# Patient Record
Sex: MALE | Race: WHITE | Employment: OTHER | URBAN - METROPOLITAN AREA
[De-identification: names, ages, dates, MRNs, and addresses within clinical notes are randomized per-mention and may not be internally consistent; named-entity substitution may affect disease eponyms.]

---

## 2022-01-01 ENCOUNTER — OFFICE VISIT - RIVER FALLS (OUTPATIENT)
Dept: FAMILY MEDICINE | Facility: CLINIC | Age: 82
End: 2022-01-01

## 2022-03-02 NOTE — PROGRESS NOTES
History of Present Illness       Resident is a new admission to the nursing home.  He was recently discharged from [facility] after a stay for rehabilitation.  He has history of obesity, type 2 diabetes, hypertension, hyperlipidemia, atrial fibrillation, heart failure, COPD and chronic kidney disease.  He had acute kidney failure and was discharged on dialysis.  His diabetes worsened during his hospital stay and his insulin demands increased.       Discharge summary reviewed, medications reconciled  Review of Systems       See HPI.  All other review of systems negative.  Physical Exam       Vital signs are stable, he is afebrile       Alert, oriented, no acute distress       Heart rate normal with an irregular rhythm       Lung sounds diminished throughout       Cooperative  Assessment/Plan       1. Age-related physical debility (R54)       2. Chronic atrial fibrillation, unspecified (I48.20)       3. Chronic kidney disease, stage 4 (severe) (N18.4)       4. Chronic obstructive pulmonary disease, unspecified (J44.9)       Patient will continue on his current medications, dialysis schedule.  Physical therapy and Occupational Therapy have been ordered and he is participating in both.  Follow-up at next nursing home rounds  Patient Information     Name:FREEDOM HENRIQUE MONAE      Address:      [address] [place] [id number]     Sex:Male     YOB: 1940     [phone]     Emergency Contact:CONCEPCIÓN LOJA     MRN:[id number]     FIN:[id number]     Location:[facility]     Date of Service:02/15/2022      Primary Care Physician:       NONE ,       Attending Physician:       Contreras Rodriguez MD, [phone]  Problem List/Past Medical History    Ongoing     Age-related physical debility     Cataracts, bilateral     Chronic atrial fibrillation, unspecified     Chronic kidney disease, stage 4 (severe)     Chronic obstructive pulmonary disease, unspecified      Congestive heart failure     Constipation, unspecified     Dyspnea, unspecified     Edema, unspecified     Essential (primary) hypertension     History of falling     Hyperglycemia, unspecified     Hyperlipidemia, unspecified     Obstructive sleep apnea (adult) (pediatric)     Other fatigue     Plantar fascial fibromatosis     Polycythemia vera     Polymyalgia rheumatica     Polyp of colon     Rheumatoid arthritis, unspecified     Type 2 diabetes mellitus without complications     Weakness    Historical     Inpatient stay       Comments: [facility] - [place], [place] - Progressive weakness, confusion, and nausea.     Tobacco use  Procedure/Surgical History     Colonoscopy (02/14/2019)     Colonoscopic polypectomy (04/20/2015)     Cataract extraction (2012)     Cardiac catheterization (09/14/2009)     Tonsillectomy  Medications   No active medications  Allergies    Victoza (Headache)    metFORMIN (Diarrhea)  Social History     Alcohol      Wine (5 oz), 3 glasses of wine per week     Electronic Cigarette/Vaping      Electronic Cigarette Use: Never.     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 Highest education level: Bachelor's.     Home/Environment      Marital status: . Lives with Daughter. Home equipment: CPAP/BiPAP, Walker/Cane.     Sexual      Sexually active: No. Identifies as male, Sexual orientation: Straight or heterosexual.     Substance Abuse - Denies Substance Abuse      Never     Tobacco - Past      Quit 1980, Pipe, 7 year(s).  Family History    Breast cancer: Sister.    CA - Cancer of colon: Father (Dx at 75).    CVA - Cerebrovascular accident: Mother.    Diabetes mellitus: Mother and Uncle.    Heart block: Son.    Hypertension: Mother.    Prostate cancer..: Father.  Immunizations          Scheduled Immunizations          Dose Date(s)          influenza virus vaccine, inactivated          10/15/2015, 09/28/2016, 09/26/2017          pneumococcal  (PCV13)          03/26/2015          pneumococcal (PPSV23)          01/20/2005          tetanus/diphth/pertuss (Tdap) adult/adol          12/12/2014

## 2022-03-02 NOTE — PROGRESS NOTES
Patient:   HENRIQUE LOJA            MRN: [id number]            FIN: [id number]               Age:   81 years     Sex:  Male     :  1940   Associated Diagnoses:   Type 2 diabetes mellitus without complications   Author:   Chaitanya Zhang MD      Visit Information      Date of Service: 2022 03:00 pm  Performing Location: [facility]  Encounter#: [id number]      Primary Care Provider (PCP):  NONE ,       Referring Provider:  Chaitanya Zhang MD    NPI# [id number]      History of Present Illness   Ask to see for high glucose No sxs      Health Status   Allergies:    Allergic Reactions (Selected)  Severity Not Documented  MetFORMIN (Diarrhea)  Victoza (Headache)   Problem list:    All Problems (Selected)  Age-related physical debility / SNOMED CT 43033570 / Confirmed  Congestive heart failure / SNOMED CT 71494445 / Confirmed  Hyperglycemia, unspecified / SNOMED CT 274472059 / Confirmed  Constipation, unspecified / SNOMED CT 515326742 / Confirmed  Chronic atrial fibrillation, unspecified / SNOMED CT 2303298887 / Confirmed  Obstructive sleep apnea (adult) (pediatric) / SNOMED CT 270082404 / Confirmed  Weakness / SNOMED CT 04556239 / Confirmed  Polyp of colon / SNOMED CT 839901284 / Confirmed  Edema, unspecified / SNOMED CT 602127482 / Confirmed  Type 2 diabetes mellitus without complications / SNOMED CT 725532442 / Confirmed  Dyspnea, unspecified / SNOMED CT 188520096 / Confirmed  Rheumatoid arthritis, unspecified / SNOMED CT 902423999 / Confirmed  Polymyalgia rheumatica / SNOMED CT 193198539 / Confirmed  Plantar fascial fibromatosis / SNOMED CT 37799113 / Confirmed  Hyperlipidemia, unspecified / SNOMED CT 08164421 / Confirmed  Essential (primary) hypertension / SNOMED CT 81974052 / Confirmed  Chronic obstructive pulmonary disease, unspecified / SNOMED CT 88075269 / Confirmed  Polycythemia vera / SNOMED CT 806839855 / Confirmed  Cataracts, bilateral / SNOMED CT 786883832 /  Confirmed  Chronic kidney disease, stage 4 (severe) / SNOMED CT 3195905475 / Confirmed  Other fatigue / SNOMED CT 372403257 / Confirmed  History of falling / SNOMED CT 7560745412 / Confirmed      Histories   Past Medical History:    Active  Age-related physical debility (14709209)  Congestive heart failure (03577995)  Hyperglycemia, unspecified (434965298)  Constipation, unspecified (836935563)  Chronic atrial fibrillation, unspecified (0131797147)  Obstructive sleep apnea (adult) (pediatric) (387266071)  Weakness (08694504)  Polyp of colon (554357223)  Edema, unspecified (605372745)  Type 2 diabetes mellitus without complications (890854272)  Dyspnea, unspecified (833036364)  Rheumatoid arthritis, unspecified (335740859)  Polymyalgia rheumatica (047056979)  Plantar fascial fibromatosis (61031170)  Hyperlipidemia, unspecified (71495933)  Essential (primary) hypertension (42651826)  Chronic obstructive pulmonary disease, unspecified (23211227)  Polycythemia vera (539797221)  Cataracts, bilateral (650504200)  Chronic kidney disease, stage 4 (severe) (0576629285)  Other fatigue (946808823)  History of falling (1653429353)  Resolved  Inpatient stay (553865827): Onset on 1/12/2022 at 81 years.  Resolved on 1/18/2022 at 81 years.  Comments:  2/10/2022 CST 2:30 PM CST - [facility] - [place], [place] - Progressive weakness, confusion, and nausea.  Tobacco use (4873429553):  Resolved.   Family History:    Diabetes mellitus  Mother  Uncle  Breast cancer  Sister  Hypertension  Mother  CA - Cancer of colon  Father: onset at 75 .  Heart block  Son  CVA - Cerebrovascular accident  Mother  Prostate cancer..  Father     Procedure history:    Colonoscopy (SNOMED CT 132599082) performed by Gilbert Beebe MD on 2/14/2019 at 78 Years.  Colonoscopic polypectomy (SNOMED CT 659135573) on 4/20/2015 at 74 Years.  Cataract extraction (SNOMED CT 06779692) in 2012 at 72 Years.  Cardiac catheterization (SNOMED CT 16984205) on 9/14/2009 at 68  Years.  Tonsillectomy (SNOMED CT 243848114).   Social History:        Electronic Cigarette/Vaping Assessment            Electronic Cigarette Use: Never.      Alcohol Assessment            Wine (5 oz), 3 glasses of wine per week      Tobacco Assessment: Past            Quit 1980, Pipe, 7 year(s).      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  Highest education level:               Bachelor's.      Home and Environment Assessment            Marital status: .  Lives with Daughter.  Home equipment: CPAP/BiPAP, Walker/Cane.      Sexual Assessment            Sexually active: No.  Identifies as male, Sexual orientation: Straight or heterosexual.        Physical Examination   General:  Alert and oriented, No acute distress.    Neurologic:  Alert.       Impression and Plan   Diagnosis     Type 2 diabetes mellitus without complications (MZM84-NH E11.9).     Plan:  Elevated glucose on prednisone for pmr  will increase insulin.